# Patient Record
Sex: FEMALE | Race: BLACK OR AFRICAN AMERICAN | NOT HISPANIC OR LATINO | Employment: UNEMPLOYED | RURAL
[De-identification: names, ages, dates, MRNs, and addresses within clinical notes are randomized per-mention and may not be internally consistent; named-entity substitution may affect disease eponyms.]

---

## 2021-02-08 ENCOUNTER — HISTORICAL (OUTPATIENT)
Dept: ADMINISTRATIVE | Facility: HOSPITAL | Age: 56
End: 2021-02-08

## 2021-02-08 LAB
ALBUMIN SERPL BCP-MCNC: 3.7 G/DL (ref 3.4–5)
ALBUMIN/GLOB SERPL: 1.1 {RATIO}
ALP SERPL-CCNC: 59 U/L (ref 46–116)
ALT SERPL W P-5'-P-CCNC: 19 U/L (ref 14–63)
ANION GAP SERPL CALCULATED.3IONS-SCNC: 12 MMOL/L
AST SERPL W P-5'-P-CCNC: 19 U/L (ref 15–37)
BASOPHILS # BLD AUTO: 0.01 X10E3/UL (ref 0–0.2)
BASOPHILS NFR BLD AUTO: 0.3 % (ref 0–1)
BILIRUB SERPL-MCNC: 0.9 MG/DL (ref 0.2–1)
BUN SERPL-MCNC: 18 MG/DL (ref 7–17)
BUN/CREAT SERPL: 20
CALCIUM SERPL-MCNC: 9.4 MG/DL (ref 8.5–10.1)
CHLORIDE SERPL-SCNC: 100 MMOL/L (ref 98–107)
CO2 SERPL-SCNC: 27 MMOL/L (ref 21–32)
CREAT SERPL-MCNC: 0.9 MG/DL (ref 0.55–1.3)
EOSINOPHIL # BLD AUTO: 0 X10E3/UL (ref 0–0.5)
EOSINOPHIL NFR BLD AUTO: 0 % (ref 1–4)
ERYTHROCYTE [DISTWIDTH] IN BLOOD BY AUTOMATED COUNT: 17 % (ref 11.5–14.5)
FLUAV AG UPPER RESP QL IA.RAPID: NEGATIVE
FLUBV AG UPPER RESP QL IA.RAPID: NEGATIVE
GLOBULIN SER-MCNC: 3.4 G/DL
GLUCOSE SERPL-MCNC: 91 MG/DL (ref 74–106)
HCT VFR BLD AUTO: 37.4 % (ref 38–47)
HGB BLD-MCNC: 12.3 G/DL (ref 12–16)
LYMPHOCYTES # BLD AUTO: 1.97 X10E3/UL (ref 1–4.8)
LYMPHOCYTES NFR BLD AUTO: 61.2 % (ref 27–41)
MCH RBC QN AUTO: 26.9 PG (ref 27–31)
MCHC RBC AUTO-ENTMCNC: 32.9 G/DL (ref 32–36)
MCV RBC AUTO: 82 FL (ref 80–96)
MONOCYTES # BLD AUTO: 0.28 X10E3/UL (ref 0–0.8)
MONOCYTES NFR BLD AUTO: 8.7 % (ref 2–6)
MPC BLD CALC-MCNC: 10.2 FL (ref 9.4–12.4)
NEUTROPHILS # BLD AUTO: 0.96 X10E3/UL (ref 1.8–7.7)
NEUTROPHILS NFR BLD AUTO: 29.8 % (ref 53–65)
PLATELET # BLD AUTO: 81 X10E3/UL (ref 150–400)
POTASSIUM SERPL-SCNC: 3.3 MMOL/L (ref 3.5–5.1)
PROT SERPL-MCNC: 7.1 G/DL (ref 6.4–8.2)
RBC # BLD AUTO: 4.57 X10E6/UL (ref 4.2–5.4)
SARS-COV+SARS-COV-2 AG RESP QL IA.RAPID: NEGATIVE
SODIUM SERPL-SCNC: 136 MMOL/L (ref 136–145)
WBC # BLD AUTO: 3.22 X10E3/UL (ref 4.5–11)

## 2021-06-16 DIAGNOSIS — G62.9 POLYNEUROPATHY: Primary | ICD-10-CM

## 2021-06-30 DIAGNOSIS — G62.9 POLYNEUROPATHY: Primary | ICD-10-CM

## 2022-05-17 DIAGNOSIS — Z74.09 IMPAIRED MOBILITY: Primary | ICD-10-CM

## 2022-05-17 DIAGNOSIS — Z87.39 HISTORY OF DEGENERATIVE DISC DISEASE: ICD-10-CM

## 2022-05-23 ENCOUNTER — CLINICAL SUPPORT (OUTPATIENT)
Dept: REHABILITATION | Facility: HOSPITAL | Age: 57
End: 2022-05-23
Payer: MEDICAID

## 2022-05-23 DIAGNOSIS — Z74.09 IMPAIRED MOBILITY: ICD-10-CM

## 2022-05-23 DIAGNOSIS — Z87.39 HISTORY OF DEGENERATIVE DISC DISEASE: ICD-10-CM

## 2022-05-23 DIAGNOSIS — R53.1 WEAKNESS GENERALIZED: ICD-10-CM

## 2022-05-23 PROCEDURE — 97163 PT EVAL HIGH COMPLEX 45 MIN: CPT

## 2022-05-23 NOTE — PLAN OF CARE
[facility]  Physical Therapy Initial Evaluation    Name: Mandi Root  [facility] Number: [id number]    Therapy Diagnosis:   Encounter Diagnoses   Name Primary?    History of degenerative disc disease     Impaired mobility      Physician: Chester Plummer, NP      Physician Orders: PT Eval and Treat   Medical Diagnosis from Referral: History of degenerative disc disease [Z87.39], Impaired mobility [Z74.09]  Evaluation Date: 5/23/2022  Authorization Period Expiration: 5/17/2023  Plan of Care Expiration: 8/15/2022  Visit # / Visits authorized: 1/ 1 (eval)    PROGRESS NOTE:6/20/2022     Time In: 1:15 PM  Time Out: 2:00 PM  Total Appointment Time (timed & untimed codes): 45 minutes     Precautions: Standard, Fall and Post Cancer    Subjective   Date of onset: October 2020  History of current condition - Mandi reports: She is recovering from breast cancer, she states she was diagnosised in October / november 2020. Patient had surgery around November 2020. Patients  reports he is here to help with subjective because his wife has been diagnosed with dementia and altizer. Patients hushand reprots she had chemo for every three weeks and she went through 7 treamtents of 13 treatment. Next was radiation and After raidation; 8 months after surgery patient  reports that she stopped walking. Patient reports that her current level of function is wheelchair bound and has to get assistance from .      Patient had 6 weeks of therapy in [facility] at [facility]; patient did not have any improvements there states . Patients  staets she did make improvement when she got home with ADLs, and improvement with some memory as well. She is able to dress herself and is able to make some progression to putting weight through her legs.      Pain:   Current 5/10, worst 7/10, best 3/10   Location: bilateral lower extremity waist down  Description: Tingling, Deep and  "Numb  Aggravating Factors: Sitting  Easing Factors: rest    Prior Therapy: Yes  Social History:  lives with their family  Occupation: Disabled; last job was a  in [employer]   Prior Level of Function: Independent before breast cancer  Current Level of Function: Wheelchair bound    Imaging, MRI studies, CT scan films, bone scan films: [facility]; patient      Medical History:   No past medical history on file.     Breast cancer    Surgical History:   Mandi Root  has no past surgical history on file.     Histercetomy    Medications:   Mandi currently has no medications in their medication list.  None; just got taken off of current medications      Allergies:   Review of patient's allergies indicates:  Not on File     Pts goals: Be able to walk.     Objective     CMS Impairment/Limitation/Restriction for FOTO lower body Survey    Therapist reviewed FOTO scores for Mandi Root on 5/23/2022.   FOTO documents entered into [employer] - see Media section.    Limitation Score: 14%          Posture/Structure: Pt presents with FHP, rounded sh's, increased LB lordosis.      Gait: Unable         Functional Mobility:  · Bed Mobility:  Mod I  · Supine to Sit: Min assistance  · Sit to Supine: Min assistance  ·   · Transfers:    · Sit to Stand:  Max assistance       Sensation: Decreased sensation to light touch left LE's, all dermatomes  Reflexes: NT    Balance: SL R=x, L=x, x.    30 second sit-to-stand test (without U/E support): 0 ( w/ UE support)  30" sit to stand Cutoff Scores:            · RLE ROM: Spasticity   · RLE Strength: 3-/5  · LLE ROM: Spasticity   · LLE Strength: 2+/5    Involutray movement with Bilateral ankles, and Knee flexion and extension      Hip Right Left Pain/Dysfunction with Movement   Hip flex 90 60 Minimal "discomfort"     Strength:     L/E MMT Right Left Pain/Dysfunction with Movement   Hip Flexion 3-/5 2/5    Knee Flexion 3-/5 2/5    Knee Extension 3-/5 2/5    Ankle DF 3-/5 " 2-/5    Ankle PF 3-/5 2/5           Palpation: Tender with increased tone over B SI jt, along upper/mid gluteals, piriformis mm, obturators, gemelli, quadratus femoris, hip flexors, piriformis, LB paraspinals, and over PSIS.  Tender at ASIS to palpation as well.       TREATMENT       Mandi Root  received the treatments listed below:       MANUAL THERAPY TECHNIQUES were applied for  minutes, including:    Manual Intervention Performed Today    Soft Tissue Mobilization      Joint Mobilizations     Mobilization with movement          Functional Dry Needling        Plan for Next Visit: PRN         THERAPEUTIC EXERCISES to develop strength, endurance, ROM, flexibility, posture, and core stabilization for  minutes including:    Intervention Performed Today    Single knee to chest x X 10   Quad set x X 10   Seated Marching x X 10   Seated LAQ x X 10    Seated ankle pumps x X 10                    Plan for Next Visit:          NEUROMUSCULAR RE-EDUCATION ACTIVITIES to improve Balance, Coordination, Kinesthetic, Sense, Proprioception, and Posture for  minutes.  The following were included:    Intervention Performed Today                                              Plan for Next Visit:          THERAPEUTIC ACTIVITIES to improve dynamic and functional  performance for  minutes including:    Intervention Performed Today    Sit to stand  x X 1 max assist   Supine <> sit x X 2 min assist   Squat piviot transfer x X 2 max assist                              Plan for Next Visit: Stander         GAIT TRAINING to improve functional mobility and safety for  minutes.    Intervention Performed Today                                              Plan for Next Visit:          -Education on condition, HEP, and     PURPOSE: Patient educated on the impairments noted above and the effects of physical therapy intervention to improve overall condition and QOL.   EXERCISE: Patient was educated on all the above exercise prior/during/after for  proper posture, positioning, and execution for safe performance with home exercise program.   STRENGTH: Patient educated on the importance of improved core and extremity strength in order to improve alignment of the spine and extremities with static positions and dynamic movement.   POSTURE: Patient educated on postural awareness to reduce stress and maintain optimal alignment of the spine with static positions and dynamic movement   TRANSFERS & TRANSITIONS: Patient educated on proper technique for bed mobility, transitions and transfers to improve body mechanics and decrease risk of injury.     Written Home Exercises Provided: yes.  Exercises were reviewed and Mandi was able to demonstrate them prior to the end of the session.  Mandi demonstrated good  understanding of the education provided.     See EMR under Patient Instructions for exercises provided 5/23/2022.    Assessment   Mandi is a 56 y.o. female referred to outpatient Physical Therapy with a medical diagnosis of History of degenerative disc disease [Z87.39], Impaired mobility [Z74.09]. The patient presents with signs and symptoms consistent with diagnosis along  with impairments which include weakness, impaired endurance, impaired sensation, impaired self care skills, impaired functional mobility, gait instability, impaired cognition, decreased coordination, decreased upper extremity function, decreased lower extremity function, decreased safety awareness, pain, abnormal tone, decreased ROM, impaired coordination, impaired fine motor and impaired muscle length.  These impairments are limiting patient's ability to perform ADLs.     Pt prognosis is Good, , if patient is consistent and compliant with PT and home exercise program.   Pt will benefit from skilled outpatient Physical Therapy to address the deficits stated above and in the chart below, provide pt/family education, and to maximize pt's level of independence.     Plan of care discussed with  patient: Yes    Pt's spiritual, cultural and educational needs considered and patient is agreeable to the plan of care and goals as stated below:     Anticipated Barriers for therapy: Chronic     Medical Necessity is demonstrated by the following  History  Co-morbidities and personal factors that may impact the plan of care Co-morbidities:   anxiety, coping style/mechanism, high BMI, history of cancer and young age    Personal Factors:   age  coping style     high   Examination  Body Structures and Functions, activity limitations and participation restrictions that may impact the plan of care Body Regions:   back  lower extremities  upper extremities    Body Systems:    gross symmetry  ROM  strength  gross coordinated movement  balance  gait  transfers  transitions  motor control  motor learning  heart rate  joint mobility, muscle tone, muscle length    Participation Restrictions:   See current level of function listed above     Activity limitations:   Learning and applying knowledge  no deficits    General Tasks and Commands  undertaking multiple tasks    Communication  no deficits    Mobility  lifting and carrying objects  fine hand use (grasping/picking up)  walking  moving around using equipment (WC)  using transportation (bus, train, plane, car)  driving (bike, car, motorcycle)    Self care  looking after one's health    Domestic Life  shopping  cooking  doing house work (cleaning house, washing dishes, laundry)  assisting others    Interactions/Relationships  no deficits    Life Areas  no deficits    Community and Social Life  community life  recreation and leisure         high   Clinical Presentation unstable clinical presentation with unpredictable characteristics high   Decision Making/ Complexity Score: high     Goals: Reviewed:5/23/2022    Short Term Goals: In 6 weeks   1.Patient to be educated on HEP.  2.Pt to decrease assistance with supine <> sit without pain, in order to improve available range of  motion for ADL's.    3.Pt to increase L Hip Flexion ROM to 70 degrees, in order to assist with more normalized gait pattern.  4.Pt to increase B LE strength by 1/2 grade demonstrated by being able to perform sit to stand x 1 with Min to Mod assits, in order to improve endurance and increase ability to ambulate for increased time.   5.Pt to have pain less than 5/10 at worst, to improve QOL.  6.Pt to improve score on the FOTO by 5%, to improve QOL.  7. Pt to be educated on postural/body mechanics awareness    Long Term Goals: In 12 weeks  1.Patient to improve score on the FOTO by 10 % or higher, to improve QOL.  2. Patient to demo increase in LE strength to 3+/5, in order to improve endurance and increase ability to stand for increased time.  3. Patient to have decreased pain to 2/10 at worst, to improve QOL.  4. Patient will perform sit to stand with Min or better assistence  5. Patient to increase hip AROM to 90 , in order to assist with more normalized gait pattern.  6. Patient to be able to tolerate being in stander for 10 min in order to promote weight bearing and in order to decrease tone in BLE.        Plan   Plan of care Certification: 5/23/2022 to 8/15/2022.    Medicaid requirements  Plan of care dates: 5/23/2022 to 8/15/2022.  CPT codes and number of unites needed per visit:   97100 x 3 per visits per week for 2 visits for 8 weeks for a total of 48 Units   97112 x 1 per visits per week for 2 visits for 8 weeks for a total of 16 Units  97530 x  1 per visits per week for 2 visits for 8 weeks for a total of 16 Units    Discharge Plan: Will discharge when goals met or functional progress stops       Last face to face visit with MD: 5/11/2022    Outpatient Physical Therapy 2 times weekly for 12 weeks to include the following interventions: Gait Training, Manual Therapy, Moist Heat/ Ice, Neuromuscular Re-ed, Orthotic Management and Training, Patient Education, Self Care, Therapeutic Activities and Therapeutic  Exercise.           I CERTIFY THE NEED FOR THESE SERVICES FURNISHED UNDER THIS PLAN OF TREATMENT AND WHILE UNDER MY CARE.       Physician's Signature: _______________________                       Date: __________________________        Luis Enrique Shen PT    Thank you for this referral.    These services are reasonable and necessary for the conditions set forth above while under my care.

## 2022-06-07 ENCOUNTER — CLINICAL SUPPORT (OUTPATIENT)
Dept: REHABILITATION | Facility: HOSPITAL | Age: 57
End: 2022-06-07
Payer: MEDICAID

## 2022-06-07 DIAGNOSIS — R53.1 WEAKNESS GENERALIZED: Primary | ICD-10-CM

## 2022-06-07 PROCEDURE — 97530 THERAPEUTIC ACTIVITIES: CPT

## 2022-06-07 PROCEDURE — 97110 THERAPEUTIC EXERCISES: CPT

## 2022-06-07 NOTE — PROGRESS NOTES
[facility]   Physical Therapy Treatment Note     Name: Mandi Root  [facility] Number: [id number]    Therapy Diagnosis:   Encounter Diagnosis   Name Primary?    Weakness generalized Yes     Physician: Chester Plummer NP    Visit Date: 6/7/2022    Physician Orders: PT Eval and Treat   Medical Diagnosis from Referral: History of degenerative disc disease [Z87.39], Impaired mobility [Z74.09]  Evaluation Date: 5/23/2022  Authorization Period Expiration: 5/17/2023  Plan of Care Expiration: 8/15/2022    PTA Visit #: 0/5     Time In: 11:00 AM  Time Out: 11:42 AM  Total Billable Time: 42  minutes    SUBJECTIVE     Pt reports: She is doing well, she was somewhat complaint with HEP.  He/She was partially compliant with home exercise program.  Response to previous treatment: Better    Functional change:  reports transfers have been better     Pain: 0/10  Location: bilateral knee      OBJECTIVE     Objective Measures updated at progress report unless specified.     Treatment     Time to Complete Exercises:     Mandi received therapeutic exercises to develop strength, endurance, ROM, flexibility, and posture for (15) minutes including: x = exercises performed     TherEx 6/7/2022    Hip Add ball Squeeze  x 3 x 10    Nu step x 6 min   Seated marching x 3 x 10   LAQ x 2 x 10 2 LB                                                       Plan for Next Visit:      Mandi received the following manual therapy techniques: Joint mobilizations, Manual traction, Myofacial release, Manual Lymphatic Drainage, Soft tissue Mobilization and Friction Massage were applied to the:  for  minutes, including:    Manual Intervention Performed Today    Soft Tissue Mobilization      Joint Mobilizations     Mobilization with movement     Active release     Functional Dry Needling       Plan for Next Visit:      NEUROMUSCULAR RE-EDUCATION ACTIVITIES to improve Balance, Coordination, Kinesthetic, Sense, Proprioception and  Posture for  minutes.  The following were included:    Intervention Performed Today                                              Plan for Next Visit:          THERAPEUTIC ACTIVITIES to improve dynamic and functional  performance for (27) minutes including:    Intervention Performed Today    Squat Pivot transfer x X 2 wheelchair <> nustep, x 2 wheelchair to mat (mod assist)   Sit to stand from wheelchair x  X3 20 - 30 ~ standing holds                                    Plan for Next Visit:           PATIENT EDUCATION AND HOME EXERCISES     Home Exercises Provided and Patient Education Provided     Education provided:  minutes   Patient educated on biomechanical justification for therapeutic exercise and importance of compliance with HEP in order to improve overall impairments and QOL    Patient was educated on all the above exercise prior/during/after for proper posture, positioning, and execution for safe performance with home exercise program.       Written Home Exercises Provided: yes.  Exercises were reviewed and Mandi was able to demonstrate them prior to the end of the session.  Mandi demonstrated good  understanding of the education provided. See EMR under Patient Instructions for exercises provided during therapy sessions.      ASSESSMENT     Mandi Root tolerated PT session well with no  complaints of pain or discomfort. Patient was easily fatigued with today's treatment session, but motivated. Therapist reviewed head to hip relationship with sit to stand transfer also reviewed with caretaker. Patient demonstrated muscular fasciculations with concentric and eccentric movement; more on left vs right. Improvement noted with verbal and tactile cues with emphasis on motor control.   Therapy exercises were reviewed by revisiting exercises given from previous home exercise program while adding bridges.  Handouts were issued during today's visit. Mandi demonstrated good understanding of new exercises and will  continue to progress at home until next follow-up.       Mandi Is progressing well towards her goals.   Pt prognosis is Good.     Pt will continue to benefit from skilled outpatient physical therapy to address the deficits listed in the problem list box on initial evaluation, provide pt/family education and to maximize pt's level of independence in the home and community environment.     Pt's spiritual, cultural and educational needs considered and pt agreeable to plan of care and goals.     Anticipated barriers to physical therapy: chronic    Goals:       Short Term Goals: In 6 weeks   1.Patient to be educated on HEP.  2.Pt to decrease assistance with supine <> sit without pain, in order to improve available range of motion for ADL's.    3.Pt to increase L Hip Flexion ROM to 70 degrees, in order to assist with more normalized gait pattern.  4.Pt to increase B LE strength by 1/2 grade demonstrated by being able to perform sit to stand x 1 with Min to Mod assits, in order to improve endurance and increase ability to ambulate for increased time.   5.Pt to have pain less than 5/10 at worst, to improve QOL.  6.Pt to improve score on the FOTO by 5%, to improve QOL.  7. Pt to be educated on postural/body mechanics awareness     Long Term Goals: In 12 weeks  1.Patient to improve score on the FOTO by 10 % or higher, to improve QOL.  2. Patient to demo increase in LE strength to 3+/5, in order to improve endurance and increase ability to stand for increased time.  3. Patient to have decreased pain to 2/10 at worst, to improve QOL.  4. Patient will perform sit to stand with Min or better assistence  5. Patient to increase hip AROM to 90 , in order to assist with more normalized gait pattern.  6. Patient to be able to tolerate being in stander for 10 min in order to promote weight bearing and in order to decrease tone in BLE.    PC = progressing/continue  PM= partially met  DC= discontinue    PLAN     Continue Plan of Care  (POC) and progress per patient tolerance. See Treatment section for exercise progression.    Luis Enrique Shen, PT

## 2022-06-09 ENCOUNTER — CLINICAL SUPPORT (OUTPATIENT)
Dept: REHABILITATION | Facility: HOSPITAL | Age: 57
End: 2022-06-09
Payer: MEDICAID

## 2022-06-09 DIAGNOSIS — R53.1 WEAKNESS GENERALIZED: Primary | ICD-10-CM

## 2022-06-09 PROCEDURE — 97110 THERAPEUTIC EXERCISES: CPT

## 2022-06-09 PROCEDURE — 97530 THERAPEUTIC ACTIVITIES: CPT

## 2022-06-09 NOTE — PROGRESS NOTES
[facility]   Physical Therapy Treatment Note     Name: Mandi Root  [facility] Number: [id number]    Therapy Diagnosis:   Encounter Diagnosis   Name Primary?    Weakness generalized Yes     Physician: Chester Plummer NP    Visit Date: 6/9/2022    Physician Orders: PT Eval and Treat   Medical Diagnosis from Referral: History of degenerative disc disease [Z87.39], Impaired mobility [Z74.09]  Evaluation Date: 5/23/2022  Authorization Period Expiration: 5/17/2023  Plan of Care Expiration: 8/15/2022    PTA Visit #: 0/5     Time In: 3:00 PM  Time Out:  3:30 PM  Total Billable Time: 30  minutes    SUBJECTIVE     Pt reports: She is doing well, she was somewhat complaint with HEP.    He/She was partially compliant with home exercise program.  Response to previous treatment: Better    Functional change:  reports transfers have been better     Pain: 0/10  Location: bilateral knee      OBJECTIVE     Objective Measures updated at progress report unless specified.     Treatment     Time to Complete Exercises:     Mandi received therapeutic exercises to develop strength, endurance, ROM, flexibility, and posture for (10) minutes including: x = exercises performed     TherEx 6/9/2022    Hip Add ball Squeeze  x 3 x 10    Nu step x 6 min   Seated marching  3 x 10   LAQ x 2 x 10 2 LB   Seated hip abduction x 3 x 10 red theraband                                                  Plan for Next Visit:      Mandi received the following manual therapy techniques: Joint mobilizations, Manual traction, Myofacial release, Manual Lymphatic Drainage, Soft tissue Mobilization and Friction Massage were applied to the:  for  minutes, including:    Manual Intervention Performed Today    Soft Tissue Mobilization      Joint Mobilizations     Mobilization with movement     Active release     Functional Dry Needling       Plan for Next Visit:      NEUROMUSCULAR RE-EDUCATION ACTIVITIES to improve Balance,  Coordination, Kinesthetic, Sense, Proprioception and Posture for  minutes.  The following were included:    Intervention Performed Today                                              Plan for Next Visit:          THERAPEUTIC ACTIVITIES to improve dynamic and functional  performance for (20) minutes including:    Intervention Performed Today    Squat Pivot transfer x X 2 wheelchair <> nustep, x 2 wheelchair to mat (mod assist)   Sit to stand from wheelchair x  X1 20 - 30 ~ standing holds    Standing marching x X 3 holding on parallel bar after standing marching                              Plan for Next Visit:           PATIENT EDUCATION AND HOME EXERCISES     Home Exercises Provided and Patient Education Provided     Education provided:  minutes   Patient educated on biomechanical justification for therapeutic exercise and importance of compliance with HEP in order to improve overall impairments and QOL    Patient was educated on all the above exercise prior/during/after for proper posture, positioning, and execution for safe performance with home exercise program.       Written Home Exercises Provided: yes.  Exercises were reviewed and Mandi was able to demonstrate them prior to the end of the session.  Mandi demonstrated good  understanding of the education provided. See EMR under Patient Instructions for exercises provided during therapy sessions.      ASSESSMENT     Patient tolerated the addition of all exercises with standing marching. Patient required verbal and tactile cues  in order to facilitate right side hip flexion with knee flexion; patient has involuntary movement as a limiting factor. Patient is progressing well. Continue to progress patient's POC as tolerated with an emphasis on functional strength, mobility and transitions.        Mandi Is progressing well towards her goals.   Pt prognosis is Good.     Pt will continue to benefit from skilled outpatient physical therapy to address the deficits  listed in the problem list box on initial evaluation, provide pt/family education and to maximize pt's level of independence in the home and community environment.     Pt's spiritual, cultural and educational needs considered and pt agreeable to plan of care and goals.     Anticipated barriers to physical therapy: chronic    Goals:       Short Term Goals: In 6 weeks   1.Patient to be educated on HEP.  2.Pt to decrease assistance with supine <> sit without pain, in order to improve available range of motion for ADL's.    3.Pt to increase L Hip Flexion ROM to 70 degrees, in order to assist with more normalized gait pattern.  4.Pt to increase B LE strength by 1/2 grade demonstrated by being able to perform sit to stand x 1 with Min to Mod assits, in order to improve endurance and increase ability to ambulate for increased time.   5.Pt to have pain less than 5/10 at worst, to improve QOL.  6.Pt to improve score on the FOTO by 5%, to improve QOL.  7. Pt to be educated on postural/body mechanics awareness     Long Term Goals: In 12 weeks  1.Patient to improve score on the FOTO by 10 % or higher, to improve QOL.  2. Patient to demo increase in LE strength to 3+/5, in order to improve endurance and increase ability to stand for increased time.  3. Patient to have decreased pain to 2/10 at worst, to improve QOL.  4. Patient will perform sit to stand with Min or better assistence  5. Patient to increase hip AROM to 90 , in order to assist with more normalized gait pattern.  6. Patient to be able to tolerate being in stander for 10 min in order to promote weight bearing and in order to decrease tone in BLE.    PC = progressing/continue  PM= partially met  DC= discontinue    PLAN     Continue Plan of Care (POC) and progress per patient tolerance. See Treatment section for exercise progression.    Luis Enrique Shen, PT

## 2022-06-13 ENCOUNTER — CLINICAL SUPPORT (OUTPATIENT)
Dept: REHABILITATION | Facility: HOSPITAL | Age: 57
End: 2022-06-13
Payer: MEDICAID

## 2022-06-13 DIAGNOSIS — Z74.09 IMPAIRED MOBILITY: ICD-10-CM

## 2022-06-13 DIAGNOSIS — R53.1 WEAKNESS GENERALIZED: Primary | ICD-10-CM

## 2022-06-13 PROCEDURE — 97530 THERAPEUTIC ACTIVITIES: CPT | Mod: CQ

## 2022-06-13 PROCEDURE — 97110 THERAPEUTIC EXERCISES: CPT | Mod: CQ

## 2022-06-13 NOTE — PROGRESS NOTES
[facility]   Physical Therapy Treatment Note     Name: Mandi Root  [facility] Number: [id number]    Therapy Diagnosis:   Encounter Diagnoses   Name Primary?    Weakness generalized Yes    Impaired mobility      Physician: Chester Plummer NP    Visit Date: 6/13/2022    Physician Orders: PT Eval and Treat   Medical Diagnosis from Referral: History of degenerative disc disease [Z87.39], Impaired mobility [Z74.09]  Evaluation Date: 5/23/2022  Authorization Period Expiration: 5/17/2023  Plan of Care Expiration: 8/15/2022  Visit # / Visits authorized: 4/ 17  PTA Visit #: 1/5     Time In: 1312  Time Out:  1344  Total Billable Time: 32 minutes    SUBJECTIVE     Pt reports: She is doing well, she was somewhat complaint with HEP.    He/She was partially compliant with home exercise program.  Response to previous treatment: Better    Functional change:  reports transfers have been better     Pain: 0/10  Location: bilateral knee      OBJECTIVE     Objective Measures updated at progress report unless specified.     Treatment     Time to Complete Exercises:     Mandi received therapeutic exercises to develop strength, endurance, ROM, flexibility, and posture for (10) minutes including: x = exercises performed     TherEx 6/13/2022    Hip Add ball Squeeze  x 3 x 10    Nu step x 6 min   Seated marching  3 x 10   LAQ  2 x 10 2 LB   Seated hip abduction x 3 x 10 red theraband                                                  Plan for Next Visit:      Mandi received the following manual therapy techniques: Joint mobilizations, Manual traction, Myofacial release, Manual Lymphatic Drainage, Soft tissue Mobilization and Friction Massage were applied to the:  for  minutes, including:    Manual Intervention Performed Today    Soft Tissue Mobilization      Joint Mobilizations     Mobilization with movement     Active release     Functional Dry Needling       Plan for Next Visit:      NEUROMUSCULAR  RE-EDUCATION ACTIVITIES to improve Balance, Coordination, Kinesthetic, Sense, Proprioception and Posture for  minutes.  The following were included:    Intervention Performed Today                                              Plan for Next Visit:          THERAPEUTIC ACTIVITIES to improve dynamic and functional  performance for (17) minutes including:    Intervention Performed Today    Squat Pivot transfer x X 2 wheelchair <> nustep, x 2 wheelchair to mat (mod assist)   Sit to stand from wheelchair x X 10 in // bars with Lenin   Standing marching x X 5 holding on parallel bar after standing marching                              Plan for Next Visit:      Mandi participated in gait training to improve functional mobility and safety for 5 minutes, including:  Patient ambulated 10 feet in // bars with Min/ModA and verbal and tactile cues for weight shifting, stride length, foot clearance, and to correct posterior lean.     PATIENT EDUCATION AND HOME EXERCISES     Home Exercises Provided and Patient Education Provided     Education provided:  minutes   Patient educated on biomechanical justification for therapeutic exercise and importance of compliance with HEP in order to improve overall impairments and QOL    Patient was educated on all the above exercise prior/during/after for proper posture, positioning, and execution for safe performance with home exercise program.       Written Home Exercises Provided: yes.  Exercises were reviewed and Mandi was able to demonstrate them prior to the end of the session.  Mandi demonstrated good  understanding of the education provided. See EMR under Patient Instructions for exercises provided during therapy sessions.      ASSESSMENT     Patient tolerated the addition of all exercises with standing marching and ambulating in // bars. Patient required verbal and tactile cues  in order to facilitate right side hip flexion with knee flexion; patient has involuntary movement as a  limiting factor. Patient is progressing well. Continue to progress patient's POC as tolerated with an emphasis on functional strength, mobility and transitions.        Mandi Is progressing well towards her goals.   Pt prognosis is Good.     Pt will continue to benefit from skilled outpatient physical therapy to address the deficits listed in the problem list box on initial evaluation, provide pt/family education and to maximize pt's level of independence in the home and community environment.     Pt's spiritual, cultural and educational needs considered and pt agreeable to plan of care and goals.     Anticipated barriers to physical therapy: chronic    Goals:       Short Term Goals: In 6 weeks   1.Patient to be educated on HEP.  2.Pt to decrease assistance with supine <> sit without pain, in order to improve available range of motion for ADL's.    3.Pt to increase L Hip Flexion ROM to 70 degrees, in order to assist with more normalized gait pattern.  4.Pt to increase B LE strength by 1/2 grade demonstrated by being able to perform sit to stand x 1 with Min to Mod assits, in order to improve endurance and increase ability to ambulate for increased time.   5.Pt to have pain less than 5/10 at worst, to improve QOL.  6.Pt to improve score on the FOTO by 5%, to improve QOL.  7. Pt to be educated on postural/body mechanics awareness     Long Term Goals: In 12 weeks  1.Patient to improve score on the FOTO by 10 % or higher, to improve QOL.  2. Patient to demo increase in LE strength to 3+/5, in order to improve endurance and increase ability to stand for increased time.  3. Patient to have decreased pain to 2/10 at worst, to improve QOL.  4. Patient will perform sit to stand with Min or better assistence  5. Patient to increase hip AROM to 90 , in order to assist with more normalized gait pattern.  6. Patient to be able to tolerate being in stander for 10 min in order to promote weight bearing and in order to decrease tone  in BLE.    PC = progressing/continue  PM= partially met  DC= discontinue    PLAN     Continue Plan of Care (POC) and progress per patient tolerance. See Treatment section for exercise progression.    Gt Queasda, PTA